# Patient Record
(demographics unavailable — no encounter records)

---

## 2025-04-22 NOTE — HEALTH RISK ASSESSMENT
[No] : In the past 12 months have you used drugs other than those required for medical reasons? No [0] : 2) Feeling down, depressed, or hopeless: Not at all (0) [PHQ-2 Negative - No further assessment needed] : PHQ-2 Negative - No further assessment needed [Never] : Never [[id number]] : 0

## 2025-04-22 NOTE — PHYSICAL EXAM
[No Acute Distress] : no acute distress [Well-Appearing] : well-appearing [Normal] : normal rate, regular rhythm, normal S1 and S2 and no murmur heard [No Edema] : there was no peripheral edema [No Focal Deficits] : no focal deficits [Normal Affect] : the affect was normal [Normal Insight/Judgement] : insight and judgment were intact [de-identified] : RLE with mild redness, seems to be almost completely subsided, no open wounds noted. Right leg is not warmer than left.

## 2025-04-22 NOTE — HISTORY OF PRESENT ILLNESS
[FreeTextEntry8] : Pt says that on Thursday she developed chills during lunch. A few days before she stubbed her right foot and injured the toe. This may have been the portal of entry as she developed a cellulitis. She went to the ER in [place]. She is on Keflex 500mg four times daily.  She has two more days left of the abx. No fever at present. She is feeling better overall. She will return to work the day after tomorrow. Today is the first day her right leg is not warmer than left. The redness is also improving. Will f/u on her A1c and lipids today as well.  Pt developed a cold sore right side of her lip.

## 2025-05-01 NOTE — REVIEW OF SYSTEMS
[Fever] : no fever [Chills] : no chills [Fatigue] : no fatigue [Chest Pain] : no chest pain [Shortness Of Breath] : no shortness of breath [de-identified] : Redness and swelling to Right lower leg and foot x 1 day

## 2025-05-01 NOTE — HISTORY OF PRESENT ILLNESS
[FreeTextEntry8] : Comes in for redness and swelling to the right leg x 1day Recently treated for cellulitis with Keflex for 10day  Report swelling and pain had resolved after ABX but returned today Denies fever or chills

## 2025-05-01 NOTE — PHYSICAL EXAM
[No Acute Distress] : no acute distress [No Respiratory Distress] : no respiratory distress  [No Accessory Muscle Use] : no accessory muscle use [Clear to Auscultation] : lungs were clear to auscultation bilaterally [Normal Rate] : normal rate  [Regular Rhythm] : with a regular rhythm [Normal S1, S2] : normal S1 and S2 [de-identified] : Edema to the right lower leg / foot  [de-identified] : Redness and swelling to the right lower leg and foot with cellulitis appearance. No drainage. Leg is warm to the touch.

## 2025-05-22 NOTE — PHYSICAL EXAM
[No Acute Distress] : no acute distress [Well-Appearing] : well-appearing [Normal] : normal rate, regular rhythm, normal S1 and S2 and no murmur heard [No Focal Deficits] : no focal deficits [Normal Affect] : the affect was normal [Normal Insight/Judgement] : insight and judgment were intact [de-identified] : very mild non pitting swelling right foot [de-identified] : RLE with mild redness, mild swelling, no warmth. not tender

## 2025-05-22 NOTE — HEALTH RISK ASSESSMENT
[No falls in past year] : Patient reported no falls in the past year [0] : 2) Feeling down, depressed, or hopeless: Not at all (0) [PHQ-2 Negative - No further assessment needed] : PHQ-2 Negative - No further assessment needed [[id number]] : 0

## 2025-05-22 NOTE — HISTORY OF PRESENT ILLNESS
[Post-hospitalization from ___ Hospital] : Post-hospitalization from [unfilled] Hospital [Admitted on: ___] : The patient was admitted on [unfilled] [Discharged on ___] : discharged on [unfilled] [Discharge Summary] : discharge summary [Pertinent Labs] : pertinent labs [Radiology Findings] : radiology findings [Discharge Med List] : discharge medication list [Med Reconciliation] : medication reconciliation has been completed [Patient Contacted By: ____] : and contacted by [unfilled] [FreeTextEntry2] : Pt was admitted to [facility] on 5/5/25 after  2 outpatient courses of abs. She was on Keflex for 10 days followed by doxycycline for 4 days. When it was not improving, pt was sent to ER for IV abx. She was admitted for 1 week as it was difficult to treat.  She was gien Cefepime and linesolid as she had an adverse reaction to Vanco.  She was treated with triamcinolone for dermatitis as well. She had no DVT or abscess. She had MRI done which confirmed cellulitis and US ruled out DVT.  Two sets of blood cultures done on 5/5 were negative. Pt has an appt with ID tomorrow.  She completed her abx yesterday. Pt says that her leg is much improved but not totally cleared.   WBC count was not high, no fever  She initially was seen at [facility] on 4/17/25  where she was given the Keflex.

## 2025-05-22 NOTE — REVIEW OF SYSTEMS
[Negative] : Heme/Lymph [Fever] : no fever [de-identified] : RLE still with redness, no warmth, mild swelling